# Patient Record
Sex: MALE | Race: BLACK OR AFRICAN AMERICAN | URBAN - METROPOLITAN AREA
[De-identification: names, ages, dates, MRNs, and addresses within clinical notes are randomized per-mention and may not be internally consistent; named-entity substitution may affect disease eponyms.]

---

## 2021-12-15 ENCOUNTER — TELEPHONE (OUTPATIENT)
Dept: NON INVASIVE DIAGNOSTICS | Age: 51
End: 2021-12-15

## 2021-12-17 ENCOUNTER — HOSPITAL ENCOUNTER (OUTPATIENT)
Dept: NON INVASIVE DIAGNOSTICS | Age: 51
Discharge: HOME | End: 2021-12-17
Payer: COMMERCIAL

## 2021-12-17 VITALS — WEIGHT: 172 LBS | BODY MASS INDEX: 24.08 KG/M2 | HEIGHT: 71 IN

## 2021-12-17 PROCEDURE — 78452 HT MUSCLE IMAGE SPECT MULT: CPT

## 2021-12-17 PROCEDURE — 6360000002 HC RX W HCPCS: Performed by: FAMILY MEDICINE

## 2021-12-17 PROCEDURE — 93017 CV STRESS TEST TRACING ONLY: CPT

## 2021-12-17 PROCEDURE — 93351 STRESS TTE COMPLETE: CPT | Performed by: INTERNAL MEDICINE

## 2021-12-17 PROCEDURE — 93351 STRESS TTE COMPLETE: CPT

## 2021-12-17 PROCEDURE — 93352 ADMIN ECG CONTRAST AGENT: CPT | Performed by: INTERNAL MEDICINE

## 2021-12-17 PROCEDURE — 2580000003 HC RX 258: Performed by: FAMILY MEDICINE

## 2021-12-17 RX ADMIN — DOBUTAMINE 10 MCG/KG/MIN: 12.5 INJECTION, SOLUTION, CONCENTRATE INTRAVENOUS at 09:45

## 2021-12-17 NOTE — PROCEDURES
Dobutamine Stress Echocardiogram:    Cardiologist: Dr. Paulo De La Cruz MD    Date: 12/17/2021    Indications for study: Chest pain    1. No chest pain   2. MPHR: 85%  3. No new arrhythmias  4. No EKG changes suggestive of stress induced ischemia  5.  Preliminary review of stress echocardiogram images demonstrates normal left ventricular function with no wall motion abnormalities    Benoit Rodriguez MD  [facility]) Cardiology

## 2022-08-04 ENCOUNTER — OFFICE VISIT (OUTPATIENT)
Dept: CARDIOLOGY CLINIC | Age: 52
End: 2022-08-04

## 2022-08-04 VITALS
WEIGHT: 170.7 LBS | SYSTOLIC BLOOD PRESSURE: 154 MMHG | RESPIRATION RATE: 16 BRPM | BODY MASS INDEX: 23.9 KG/M2 | OXYGEN SATURATION: 96 % | HEIGHT: 71 IN | DIASTOLIC BLOOD PRESSURE: 86 MMHG | HEART RATE: 77 BPM

## 2022-08-04 DIAGNOSIS — R07.9 CHEST PAIN, UNSPECIFIED TYPE: Primary | ICD-10-CM

## 2022-08-04 PROCEDURE — 93000 ELECTROCARDIOGRAM COMPLETE: CPT | Performed by: INTERNAL MEDICINE

## 2022-08-04 PROCEDURE — 99203 OFFICE O/P NEW LOW 30 MIN: CPT | Performed by: INTERNAL MEDICINE

## 2022-08-04 RX ORDER — ASPIRIN 81 MG/1
81 TABLET, CHEWABLE ORAL DAILY
COMMUNITY

## 2022-08-04 RX ORDER — AMLODIPINE BESYLATE 10 MG/1
10 TABLET ORAL DAILY
COMMUNITY

## 2022-08-04 RX ORDER — NITROGLYCERIN 0.4 MG/1
0.4 TABLET SUBLINGUAL EVERY 5 MIN PRN
COMMUNITY

## 2022-08-04 RX ORDER — LISINOPRIL 10 MG/1
10 TABLET ORAL DAILY
COMMUNITY

## 2022-08-04 ASSESSMENT — ENCOUNTER SYMPTOMS
SHORTNESS OF BREATH: 0
VOMITING: 0
ABDOMINAL PAIN: 0
CONSTIPATION: 0
BLOOD IN STOOL: 0
WHEEZING: 0
COUGH: 0
NAUSEA: 0
BACK PAIN: 0
DIARRHEA: 0

## 2022-08-04 NOTE — PROGRESS NOTES
OUTPATIENT CARDIOLOGY CONSULT    Name: Arabella Bean    Age: 45 y.o. Date of Service: 8/4/2022    Reason for Consultation: Chest pain. Referring Physician: No primary care provider on file. History of Present Illness:  12-year-old -American male who was brought from [facility] today for cardiac evaluation due to chest pain. He has history of hypertension and anxiety. He has been complaining of on and off chest pain for many many years. His discomfort occurs at rest and last few seconds. Patient had few episode recently lasting couple of hours and preceded by palpitations. He is not compliant with his blood pressure medication. He denies dyspnea, dizziness or syncope. EKG done today revealed sinus rhythm at 77 bpm with left atrial enlargement. Review of Systems:  Review of Systems   Constitutional:  Negative for chills, fatigue and fever. HENT:  Negative for nosebleeds. Respiratory:  Negative for cough, shortness of breath and wheezing. Cardiovascular:  Positive for chest pain. Gastrointestinal:  Negative for abdominal pain, blood in stool, constipation, diarrhea, nausea and vomiting. Genitourinary:  Negative for dysuria and hematuria. Musculoskeletal:  Negative for back pain, joint swelling and myalgias. Neurological:  Negative for syncope and light-headedness. Psychiatric/Behavioral:  The patient is nervous/anxious. Past Medical History:  No past medical history on file. Past Surgical History:  No past surgical history on file. Family History:  No family history on file.     Social History:  Social History     Socioeconomic History    Marital status: Unknown     Spouse name: Not on file    Number of children: Not on file    Years of education: Not on file    Highest education level: Not on file   Occupational History    Not on file   Tobacco Use    Smoking status: Not on file    Smokeless tobacco: Not on file   Substance and Sexual Activity    Alcohol use: Not on file    Drug use: Not on file    Sexual activity: Not on file   Other Topics Concern    Not on file   Social History Narrative    Not on file     Social Determinants of Health     Financial Resource Strain: Not on file   Food Insecurity: Not on file   Transportation Needs: Not on file   Physical Activity: Not on file   Stress: Not on file   Social Connections: Not on file   Intimate Partner Violence: Not on file   Housing Stability: Not on file       Allergies:  No Known Allergies    Current Medications:  No current outpatient medications on file. No current facility-administered medications for this visit. Physical Exam:  Ht 5' 11\" (1.803 m)   BMI 23.99 kg/m²   Wt Readings from Last 3 Encounters:   12/17/21 172 lb (78 kg)     Physical Exam:  Ht 5' 11\" (1.803 m)   BMI 23.99 kg/m²   Wt Readings from Last 3 Encounters:   12/17/21 172 lb (78 kg)     Physical Exam  Constitutional:       General: He is not in acute distress. Appearance: He is well-developed. HENT:      Head: Normocephalic and atraumatic. Neck:      Vascular: No carotid bruit or JVD. Cardiovascular:      Rate and Rhythm: Normal rate and regular rhythm. Heart sounds: No murmur heard. No friction rub. No gallop. Pulmonary:      Breath sounds: Normal breath sounds. No wheezing or rales. Chest:      Chest wall: No tenderness. Abdominal:      General: Bowel sounds are normal. There is no distension. Palpations: Abdomen is soft. There is no mass. Tenderness: There is no abdominal tenderness. Comments: No abdominal bruit. Musculoskeletal:      Cervical back: Neck supple. Right lower leg: No edema. Left lower leg: No edema. Skin:     General: Skin is warm and dry. Neurological:      Mental Status: He is alert and oriented to person, place, and time.           Cardiac Tests:    Dobutamine Stress Echocardiogram: Done on 12/17/2021     Cardiologist: Dr. Betsy Nye MD     Date: 12/17/2021     Indications for study: Chest pain     No chest pain  MPHR: 85%  No new arrhythmias  No EKG changes suggestive of stress induced ischemia  Preliminary review of stress echocardiogram images demonstrates normal left ventricular function with no wall motion abnormalities     Crystal Herman MD  [facility]) Cardiology    ASSESSMENT / PLAN:  -Chest pain: It is chronic and sounded atypical in nature. Patient had a negative dobutamine stress echo in December of last year.  -Hypertension: Mildly elevated and probably due to noncompliance with medical therapy.  -Palpitations: Could be due to elevated blood pressure at times and anxiety  -Anxiety. Will continue patient on his current blood pressure medications. Will need to monitor his blood pressure twice a day for the next 3 to 5 days to make sure his blood pressure is controlled. Consider adding Toprol if needed for blood pressure control to help suppressing palpitations. Consider 14-day ZIO XT monitor if patient continues to complain of palpitations despite controlling his blood pressure. Will follow-up at the office in 1 year. Thank you for allowing me to participate in your patient's care. Please feel free to contact me if you have any questions or concerns.     Madhu Fletcher MD [facility] [place], [address] [facility] Cardiology

## 2023-07-20 ENCOUNTER — OFFICE VISIT (OUTPATIENT)
Dept: CARDIOLOGY CLINIC | Age: 53
End: 2023-07-20

## 2023-07-20 VITALS
DIASTOLIC BLOOD PRESSURE: 60 MMHG | WEIGHT: 169 LBS | SYSTOLIC BLOOD PRESSURE: 136 MMHG | BODY MASS INDEX: 23.66 KG/M2 | OXYGEN SATURATION: 99 % | RESPIRATION RATE: 16 BRPM | HEART RATE: 82 BPM | HEIGHT: 71 IN

## 2023-07-20 DIAGNOSIS — R07.2 PRECORDIAL PAIN: Primary | ICD-10-CM

## 2023-07-20 PROCEDURE — 93000 ELECTROCARDIOGRAM COMPLETE: CPT | Performed by: INTERNAL MEDICINE

## 2023-07-20 PROCEDURE — 99213 OFFICE O/P EST LOW 20 MIN: CPT | Performed by: INTERNAL MEDICINE

## 2023-07-20 ASSESSMENT — ENCOUNTER SYMPTOMS
VOMITING: 0
BLOOD IN STOOL: 0
BACK PAIN: 0
DIARRHEA: 0
ABDOMINAL PAIN: 1
SHORTNESS OF BREATH: 0
CONSTIPATION: 0
NAUSEA: 0
WHEEZING: 0
COUGH: 0

## 2023-07-20 NOTE — PROGRESS NOTES
OUTPATIENT CARDIOLOGY FOLLOW-UP    HPI:    Name: Aria Posadas    Age: 52 y.o. Primary Care Physician: No primary care provider on file. Date of Service: 7/20/2023    Chief Complaint: 1 year follow-up visit. History of present illness :   78-year-old -American male who was brought from retirement today due to chest pain and shortness of breath. He was seen my office on 8/4/2022 due to chronic chest pain. He has history of hypertension , antisocial behavior, anxiety and noncompliance. Patient noted that he is not smoking. He has been complaining of sporadic chest pain for many years. His pain occurs at rest but does not get worse with activities. He sometimes feels dyspnea on exertion and palpitations but denies dizziness or syncope. He denies lower extremity edema. EKG done today reviewed sinus rhythm at 82 bpm, left atrial enlargement, and incomplete right bundle branch block. Review of Systems:   Review of Systems   Constitutional:  Negative for chills, fatigue and fever. HENT:  Negative for nosebleeds. Respiratory:  Negative for cough, shortness of breath and wheezing. Gastrointestinal:  Positive for abdominal pain. Negative for blood in stool, constipation, diarrhea, nausea and vomiting. GERD. Genitourinary:  Negative for dysuria and hematuria. Musculoskeletal:  Negative for back pain, joint swelling and myalgias. Aching. Neurological:  Negative for syncope and light-headedness. Psychiatric/Behavioral:  The patient is nervous/anxious. Past Medical History:  No past medical history on file. Past Surgical History:  No past surgical history on file. Family History:  No family history on file.     Social History:  Social History     Socioeconomic History    Marital status: Unknown     Spouse name: Not on file    Number of children: Not on file    Years of education: Not on file    Highest education level: Not on file   Occupational History